# Patient Record
Sex: MALE | Race: WHITE
[De-identification: names, ages, dates, MRNs, and addresses within clinical notes are randomized per-mention and may not be internally consistent; named-entity substitution may affect disease eponyms.]

---

## 2022-03-14 ENCOUNTER — NON-APPOINTMENT (OUTPATIENT)
Age: 57
End: 2022-03-14

## 2022-03-14 ENCOUNTER — APPOINTMENT (OUTPATIENT)
Dept: COLORECTAL SURGERY | Facility: CLINIC | Age: 57
End: 2022-03-14
Payer: SELF-PAY

## 2022-03-14 VITALS
SYSTOLIC BLOOD PRESSURE: 141 MMHG | HEART RATE: 66 BPM | WEIGHT: 218 LBS | TEMPERATURE: 97.3 F | BODY MASS INDEX: 29.53 KG/M2 | DIASTOLIC BLOOD PRESSURE: 77 MMHG | HEIGHT: 72 IN

## 2022-03-14 DIAGNOSIS — C20 MALIGNANT NEOPLASM OF RECTUM: ICD-10-CM

## 2022-03-14 PROBLEM — Z00.00 ENCOUNTER FOR PREVENTIVE HEALTH EXAMINATION: Status: ACTIVE | Noted: 2022-03-14

## 2022-03-14 PROCEDURE — 99203 OFFICE O/P NEW LOW 30 MIN: CPT

## 2022-03-14 NOTE — PHYSICAL EXAM
[Abdomen Masses] : No abdominal masses [Abdomen Tenderness] : ~T No ~M abdominal tenderness [No HSM] : no hepatosplenomegaly [Normal] : was normal [None] : there was no rectal mass  [FreeTextEntry1] : The risks , benefits and alternatives of the procedure were reviewed with the patient. The patient consents to the planned procedure.\par \par The flexible sigmoidoscope was passed through the anus into the rectum. The scope was passed to  25   cm from the anal verge.\par A medical assistant was present throughout the procedure.\par \par Rigid proctosigmoidoscopy was performed after risks, benefits alternatives were outlined. The procedure was performed for evaluation of rectal bleeding.\par \par A rigid proctoscope was passed through the anus into the rectum to    12 centimeters. The mucosal surface was inspected. The patient tolerated procedure well.\par \par The findings revealed:\par At 5 cm from the anal verge area of polypectomy identified with residual clip in place.  This site was 2 cm from dentate line\par \par \par \par The findings revealed:\par \par

## 2022-03-14 NOTE — HISTORY OF PRESENT ILLNESS
[FreeTextEntry1] : 55 y/o M presents to the office for initial evaluation of possible rectal cancer. Upon his 3rd routine surveillance colonoscopy was found to have a 2.5cm rectal lesion concerning for adenocarcinoma. He subsequently underwent an ESD with Dr. Jimenez. 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 Margins were negative, with closest deep margin 0.5mm. He has since undergone at PET/CT revealing only inflammatory reactivity at his endoscopic resection site. He otherwise denies any symptomatology. Eating normally, having normal daily formed nonbloody BMs, denies weight loss. \par \par PSH: b/l herniorrhaphy as a child\par Denies FMH of colorectal cancer\par Last colonoscopy January 2022

## 2022-03-14 NOTE — ASSESSMENT
[FreeTextEntry1] : I had extensive discussion with the patient–30 minutes regarding the findings on exam and prior treatment for his distal rectal malignant polyp.  I have outlined to the patient that he will require an MRI for the completion of staging for his rectal cancer.  I have discussed with him that if no evidence of abnormalities are identified MRI the treatment options for standard of care for treatment would be a low anterior resection with coloanal anastomosis/temporary ileostomy creation versus abdominal perineal resection and permanent colostomy creation.  This would address definitively as the polypectomy site for residual risk of cancer as well as the mesorectum.  The overall risk of residual disease is approximately 5% in this circumstance.  I have discussed with him conservative measures would include close observation with every 4 month sigmoidoscopy and MRI.  However the risk of potential recurrent cancer may be locally at the polypectomy site, regionally in the mesorectal lymph nodes or metastatic including lung and liver.  The risk, benefits and alternatives of the treatment options were outlined and reviewed.  All questions answered.